# Patient Record
Sex: FEMALE | Race: BLACK OR AFRICAN AMERICAN | Employment: FULL TIME | URBAN - METROPOLITAN AREA
[De-identification: names, ages, dates, MRNs, and addresses within clinical notes are randomized per-mention and may not be internally consistent; named-entity substitution may affect disease eponyms.]

---

## 2024-03-05 ENCOUNTER — OFFICE VISIT (OUTPATIENT)
Age: 29
End: 2024-03-05

## 2024-03-05 VITALS
HEIGHT: 67 IN | RESPIRATION RATE: 19 BRPM | OXYGEN SATURATION: 97 % | BODY MASS INDEX: 34.06 KG/M2 | HEART RATE: 73 BPM | TEMPERATURE: 99.4 F | SYSTOLIC BLOOD PRESSURE: 106 MMHG | DIASTOLIC BLOOD PRESSURE: 76 MMHG | WEIGHT: 217 LBS

## 2024-03-05 DIAGNOSIS — R05.1 ACUTE COUGH: ICD-10-CM

## 2024-03-05 DIAGNOSIS — J06.9 VIRAL UPPER RESPIRATORY TRACT INFECTION: Primary | ICD-10-CM

## 2024-03-05 LAB
INFLUENZA A ANTIGEN, POC: NEGATIVE
INFLUENZA B ANTIGEN, POC: NEGATIVE
Lab: NORMAL
QC PASS/FAIL: NORMAL

## 2024-03-05 RX ORDER — BROMPHENIRAMINE MALEATE, PSEUDOEPHEDRINE HYDROCHLORIDE, AND DEXTROMETHORPHAN HYDROBROMIDE 2; 30; 10 MG/5ML; MG/5ML; MG/5ML
10 SYRUP ORAL 4 TIMES DAILY PRN
Qty: 118 ML | Refills: 0 | Status: SHIPPED | OUTPATIENT
Start: 2024-03-05

## 2024-03-05 ASSESSMENT — ENCOUNTER SYMPTOMS
COUGH: 1
NAUSEA: 0
VOMITING: 0
TROUBLE SWALLOWING: 0
RHINORRHEA: 1
DIARRHEA: 0
SHORTNESS OF BREATH: 0
ABDOMINAL PAIN: 0

## 2024-03-05 NOTE — PROGRESS NOTES
Leona Zhu (:  1995) is a 28 y.o. female, New patient, here for evaluation of the following chief complaint(s):  Cough, Fever, Headache, and Generalized Body Aches    ASSESSMENT/PLAN:    ICD-10-CM    1. Viral upper respiratory tract infection  J06.9 POCT Influenza A/B Antigen (BD Veritor)     POCT COVID-19 Rapid, NAAT     brompheniramine-pseudoephedrine-DM 2-30-10 MG/5ML syrup      2. Acute cough  R05.1 POCT Influenza A/B Antigen (BD Veritor)     POCT COVID-19 Rapid, NAAT     brompheniramine-pseudoephedrine-DM 2-30-10 MG/5ML syrup        POC testing: Discussed result(s) with patient  Results for POC orders placed in visit on 24   POCT Influenza A/B Antigen (BD Veritor)   Result Value Ref Range    Inflenza A Ag Negative     Influenza B Ag Negative      COVID- negative   Ddx includes: URI, Influenza A/B, Sinusitis, Pneumonia  Disposition: Pt is 27yo female here with URI s/s. VSS. Physical Exam as below. Flu A/B and COVID are negative.  Advised supportive care for URI. PRN Bromfed DM rx sent per pt request.  Reviewed AVS with patient. All questions answered. If symptoms worsen go to the Emergency Department. Follow up in clinic or with PCP as needed. Patient is agreeable with plan.   SUBJECTIVE/OBJECTIVE:  27yo female here with c/o fever,chills, cough and bodyaches since yesterday. Denies sob or cp. Endorses sick contacts. Care prior to arrival includes Ibuprofen.       History provided by:  Patient   used: No      Vitals:    24 1530   BP: 106/76   Position: Sitting   Cuff Size: Large Adult   Pulse: 73   Resp: 19   Temp: 99.4 °F (37.4 °C)   SpO2: 97%   Weight: 98.4 kg (217 lb)   Height: 1.702 m (5' 7\")     Review of Systems   Constitutional:  Positive for fatigue and fever.   HENT:  Positive for congestion and rhinorrhea. Negative for trouble swallowing.    Respiratory:  Positive for cough. Negative for shortness of breath.    Cardiovascular:  Negative for chest pain.

## 2024-03-05 NOTE — PATIENT INSTRUCTIONS
New Prescriptions    BROMPHENIRAMINE-PSEUDOEPHEDRINE-DM 2-30-10 MG/5ML SYRUP    Take 10 mLs by mouth 4 times daily as needed for Cough     Increase fluid intake.  Ibuprofen and Tylenol for fever or pain (If not contraindicated).  Follow up with PCP in 1 week or sooner for worsening symptoms.

## 2024-09-22 ENCOUNTER — OFFICE VISIT (OUTPATIENT)
Age: 29
End: 2024-09-22

## 2024-09-22 VITALS
TEMPERATURE: 98.2 F | OXYGEN SATURATION: 98 % | SYSTOLIC BLOOD PRESSURE: 103 MMHG | DIASTOLIC BLOOD PRESSURE: 70 MMHG | RESPIRATION RATE: 18 BRPM | HEART RATE: 86 BPM

## 2024-09-22 DIAGNOSIS — R09.89 CHEST CONGESTION: ICD-10-CM

## 2024-09-22 DIAGNOSIS — U07.1 COVID-19 VIRUS INFECTION: Primary | ICD-10-CM

## 2024-09-22 LAB
Lab: ABNORMAL
QC PASS/FAIL: ABNORMAL
SARS-COV-2 RDRP RESP QL NAA+PROBE: POSITIVE

## 2024-09-22 RX ORDER — DEXTROMETHORPHAN HYDROBROMIDE AND PROMETHAZINE HYDROCHLORIDE 15; 6.25 MG/5ML; MG/5ML
5 SYRUP ORAL 4 TIMES DAILY PRN
Qty: 118 ML | Refills: 0 | Status: SHIPPED | OUTPATIENT
Start: 2024-09-22 | End: 2024-09-29